# Patient Record
Sex: FEMALE | Race: WHITE
[De-identification: names, ages, dates, MRNs, and addresses within clinical notes are randomized per-mention and may not be internally consistent; named-entity substitution may affect disease eponyms.]

---

## 2020-08-24 ENCOUNTER — HOSPITAL ENCOUNTER (EMERGENCY)
Dept: HOSPITAL 74 - JVIRT | Age: 40
Discharge: HOME | End: 2020-08-24
Payer: COMMERCIAL

## 2020-08-24 DIAGNOSIS — Z11.59: Primary | ICD-10-CM

## 2020-08-24 PROCEDURE — U0003 INFECTIOUS AGENT DETECTION BY NUCLEIC ACID (DNA OR RNA); SEVERE ACUTE RESPIRATORY SYNDROME CORONAVIRUS 2 (SARS-COV-2) (CORONAVIRUS DISEASE [COVID-19]), AMPLIFIED PROBE TECHNIQUE, MAKING USE OF HIGH THROUGHPUT TECHNOLOGIES AS DESCRIBED BY CMS-2020-01-R: HCPCS

## 2020-08-24 NOTE — TELE
HPI


Do you have fever,cough or shortness of breath?: Yes





- General


Reason For Visit: [id number]


Time Seen by Provider: 08/24/20 12:48


History Source: Patient


Exam Limitations: Clinical Condition





- History of Present Illness


Timing/Duration: unsure


Associated Symptoms: reports: denies symptoms.  denies: cough, fever/chills, 

nausea/vomiting, rash, shortness of breath, syncope, weakness





08/24/20 12:49


Patient with no significant past medical history present to [facility] urgent care 

for COVID testing status post returning from [place] a few days ago.  Patient 

denies any symptoms.  Denies cough, shortness of breath, fever.  Patient just 

want COVID testing done to make sure she was not exposed while in [place].





**Review of Systems





- Review of Systems


Able to Perform ROS?: Yes


Constitutional: No: Chills, Fever, Malaise


HEENTM: No: Symptoms Reported, See HPI, Eye Pain, Blurred Vision, Tearing, 

Recent change in vision, Double Vision, Cataracts, Ear Pain, Ocular Prothesis, 

Ear Discharge, Nose Pain, Nose Congestion, Tinnitus, Nose Bleeding, Hearing 

Loss, Throat Pain, Throat Swelling, Mouth Pain, Dental Problems, Difficulty 

Swallowing, Mouth Swelling, Other


Respiratory: No: Symptoms reported, See HPI, Cough, Orthopnea, Shortness of 

Breath, SOB with Exertion, SOB at Rest, Stridor, Wheezing, Productive cough, 

Hemoptysis, Other


Cardiac (ROS): No: Symptoms Reported, See HPI, Chest Pain, Edema, Irregular 

Heart Rate, Lightheadedness, Palpitations, Syncope, Chest Tightness, Other


ABD/GI: No: Symptoms Reported, Nausea, Vomiting


All Other Systems: Reviewed and Negative





*Physical Exam





- Physical Exam


General Appearance: Yes: Nourished, Appropriately Dressed.  No: Apparent Distr

ess


HEENT: positive: Normal ENT Inspection


Respiratory/Chest: negative: Respiratory Distress, Accessory Muscle Use


Musculoskeletal: positive: Normal Inspection


Extremity: positive: Normal Inspection, Normal Range of Motion


Integumentary: positive: Normal Color


Neurologic: positive: Fully Oriented, Alert, Normal Mood/Affect, Normal 

Response, Motor Strength 5/5





- Medical Decision Making





08/24/20 12:50


Asymptomatic patient presented for COVID testing post traveling to [place].  

Patient asymptomatic at this time.





Discussed self quarantine instructions with patient.  COVID tests ordered.  

Patient to go to [facility] [employer] [facility] testing center for testing today.  

Patient stable for discharge





Discharge


Diagnosis at time of Disposition: 


 Encounter by telehealth for suspected COVID-19








- Referrals


Follow-up Referral(s): 


ON STAFF,NOT [Primary Care Provider] - 





- Patient Instructions


Discharge Instructions:  [facility]Coron[facility] Instructions, [facility] COVID-19 Isolation Protocol





- Discharge


Disposition: HOME


Condition at time of Disposition: Stable

## 2025-02-18 PROBLEM — Z00.00 ENCOUNTER FOR PREVENTIVE HEALTH EXAMINATION: Status: ACTIVE | Noted: 2025-02-18

## 2025-02-19 ENCOUNTER — NON-APPOINTMENT (OUTPATIENT)
Age: 45
End: 2025-02-19

## 2025-02-19 ENCOUNTER — APPOINTMENT (OUTPATIENT)
Dept: OBGYN | Facility: CLINIC | Age: 45
End: 2025-02-19
Payer: COMMERCIAL

## 2025-02-19 VITALS
HEIGHT: 61 IN | SYSTOLIC BLOOD PRESSURE: 110 MMHG | BODY MASS INDEX: 25.49 KG/M2 | DIASTOLIC BLOOD PRESSURE: 80 MMHG | WEIGHT: 135 LBS

## 2025-02-19 DIAGNOSIS — N95.1 MENOPAUSAL AND FEMALE CLIMACTERIC STATES: ICD-10-CM

## 2025-02-19 DIAGNOSIS — G43.909 MIGRAINE, UNSPECIFIED, NOT INTRACTABLE, W/OUT STATUS MIGRAINOSUS: ICD-10-CM

## 2025-02-19 DIAGNOSIS — Z72.3 LACK OF PHYSICAL EXERCISE: ICD-10-CM

## 2025-02-19 DIAGNOSIS — Z80.3 FAMILY HISTORY OF MALIGNANT NEOPLASM OF BREAST: ICD-10-CM

## 2025-02-19 DIAGNOSIS — Z80.51 FAMILY HISTORY OF MALIGNANT NEOPLASM OF KIDNEY: ICD-10-CM

## 2025-02-19 DIAGNOSIS — Z80.1 FAMILY HISTORY OF MALIGNANT NEOPLASM OF TRACHEA, BRONCHUS AND LUNG: ICD-10-CM

## 2025-02-19 PROCEDURE — 99203 OFFICE O/P NEW LOW 30 MIN: CPT

## 2025-02-19 RX ORDER — PROGESTERONE 100 MG/1
100 CAPSULE ORAL
Qty: 30 | Refills: 3 | Status: ACTIVE | COMMUNITY
Start: 2025-02-19 | End: 1900-01-01

## 2025-02-19 RX ORDER — ESTRADIOL 0.03 MG/D
0.03 PATCH, EXTENDED RELEASE TRANSDERMAL
Qty: 24 | Refills: 3 | Status: ACTIVE | COMMUNITY
Start: 2025-02-19 | End: 1900-01-01

## 2025-02-20 PROBLEM — G43.909 MIGRAINE: Status: RESOLVED | Noted: 2025-02-20 | Resolved: 2025-02-20

## 2025-02-20 PROBLEM — Z72.3 DOES NOT EXERCISE: Status: ACTIVE | Noted: 2025-02-19

## 2025-02-20 PROBLEM — Z80.51 FAMILY HISTORY OF MALIGNANT NEOPLASM OF KIDNEY: Status: ACTIVE | Noted: 2025-02-19

## 2025-02-20 PROBLEM — Z80.1 FAMILY HISTORY OF LUNG CANCER: Status: ACTIVE | Noted: 2025-02-19

## 2025-02-20 PROBLEM — Z80.3 FAMILY HISTORY OF MALIGNANT NEOPLASM OF FEMALE BREAST: Status: ACTIVE | Noted: 2025-02-19

## 2025-02-20 RX ORDER — PANTOPRAZOLE 40 MG/1
40 TABLET, DELAYED RELEASE ORAL
Refills: 0 | Status: ACTIVE | COMMUNITY

## 2025-03-12 RX ORDER — ESTRADIOL 0.04 MG/D
0.04 PATCH, EXTENDED RELEASE TRANSDERMAL
Qty: 24 | Refills: 3 | Status: ACTIVE | COMMUNITY
Start: 2025-03-12 | End: 1900-01-01

## 2025-04-30 ENCOUNTER — ASOB RESULT (OUTPATIENT)
Age: 45
End: 2025-04-30

## 2025-04-30 ENCOUNTER — APPOINTMENT (OUTPATIENT)
Dept: OBGYN | Facility: CLINIC | Age: 45
End: 2025-04-30

## 2025-04-30 ENCOUNTER — APPOINTMENT (OUTPATIENT)
Dept: OBGYN | Facility: CLINIC | Age: 45
End: 2025-04-30
Payer: COMMERCIAL

## 2025-04-30 VITALS — HEIGHT: 61 IN | DIASTOLIC BLOOD PRESSURE: 74 MMHG | SYSTOLIC BLOOD PRESSURE: 116 MMHG

## 2025-04-30 DIAGNOSIS — Z01.419 ENCOUNTER FOR GYNECOLOGICAL EXAMINATION (GENERAL) (ROUTINE) W/OUT ABNORMAL FINDINGS: ICD-10-CM

## 2025-04-30 DIAGNOSIS — Z11.51 ENCOUNTER FOR SCREENING FOR HUMAN PAPILLOMAVIRUS (HPV): ICD-10-CM

## 2025-04-30 DIAGNOSIS — Z12.4 ENCOUNTER FOR SCREENING FOR MALIGNANT NEOPLASM OF CERVIX: ICD-10-CM

## 2025-04-30 PROCEDURE — 76830 TRANSVAGINAL US NON-OB: CPT

## 2025-04-30 PROCEDURE — 99396 PREV VISIT EST AGE 40-64: CPT

## 2025-05-02 LAB — HPV HIGH+LOW RISK DNA PNL CVX: NOT DETECTED

## 2025-05-05 LAB — CYTOLOGY CVX/VAG DOC THIN PREP: NORMAL

## 2025-07-18 ENCOUNTER — APPOINTMENT (OUTPATIENT)
Dept: OBGYN | Facility: CLINIC | Age: 45
End: 2025-07-18
Payer: COMMERCIAL

## 2025-07-18 ENCOUNTER — ASOB RESULT (OUTPATIENT)
Age: 45
End: 2025-07-18

## 2025-07-18 PROCEDURE — 76830 TRANSVAGINAL US NON-OB: CPT

## 2025-07-21 ENCOUNTER — NON-APPOINTMENT (OUTPATIENT)
Age: 45
End: 2025-07-21

## 2025-09-05 ENCOUNTER — APPOINTMENT (OUTPATIENT)
Dept: OBGYN | Facility: CLINIC | Age: 45
End: 2025-09-05

## 2025-09-08 ENCOUNTER — APPOINTMENT (OUTPATIENT)
Dept: OBGYN | Facility: CLINIC | Age: 45
End: 2025-09-08
Payer: COMMERCIAL

## 2025-09-08 PROCEDURE — 99214 OFFICE O/P EST MOD 30 MIN: CPT | Mod: 95

## 2025-09-08 RX ORDER — ESTRADIOL HEMIHYDRT,MICRONIZED 100 %
POWDER (GRAM) MISCELLANEOUS
Qty: 15 | Refills: 2 | Status: ACTIVE | COMMUNITY
Start: 2025-09-08 | End: 1900-01-01